# Patient Record
Sex: MALE | Race: WHITE
[De-identification: names, ages, dates, MRNs, and addresses within clinical notes are randomized per-mention and may not be internally consistent; named-entity substitution may affect disease eponyms.]

---

## 2018-07-04 ENCOUNTER — HOSPITAL ENCOUNTER (EMERGENCY)
Dept: HOSPITAL 63 - ER | Age: 78
Discharge: LEFT BEFORE DISCHARGE | End: 2018-07-04
Payer: COMMERCIAL

## 2018-07-04 VITALS — SYSTOLIC BLOOD PRESSURE: 200 MMHG | DIASTOLIC BLOOD PRESSURE: 91 MMHG

## 2018-07-04 DIAGNOSIS — Z71.6: ICD-10-CM

## 2018-07-04 DIAGNOSIS — J44.9: ICD-10-CM

## 2018-07-04 DIAGNOSIS — Z91.19: ICD-10-CM

## 2018-07-04 DIAGNOSIS — Z72.0: ICD-10-CM

## 2018-07-04 DIAGNOSIS — R06.02: Primary | ICD-10-CM

## 2018-07-04 DIAGNOSIS — Z93.0: ICD-10-CM

## 2018-07-04 DIAGNOSIS — Z85.21: ICD-10-CM

## 2018-07-04 DIAGNOSIS — R47.81: ICD-10-CM

## 2018-07-04 DIAGNOSIS — Z93.1: ICD-10-CM

## 2018-07-04 LAB
% BANDS: 4 % (ref 0–9)
% LYMPHS: 9 % (ref 24–48)
% MONOS: 5 % (ref 0–10)
% SEGS: 76 % (ref 35–66)
ALBUMIN SERPL-MCNC: 3.8 G/DL (ref 3.4–5)
ALBUMIN/GLOB SERPL: 0.9 {RATIO} (ref 1–1.7)
ALP SERPL-CCNC: 103 U/L (ref 46–116)
ALT SERPL-CCNC: 21 U/L (ref 16–63)
ANION GAP SERPL CALC-SCNC: 10 MMOL/L (ref 6–14)
ANISOCYTOSIS BLD QL SMEAR: PRESENT
AST SERPL-CCNC: 25 U/L (ref 15–37)
BASOPHILS # BLD AUTO: 0.1 X10^3/UL (ref 0–0.2)
BASOPHILS NFR BLD AUTO: 0 % (ref 0–3)
BASOPHILS NFR BLD: 1 % (ref 0–3)
BILIRUB SERPL-MCNC: 0.4 MG/DL (ref 0.2–1)
BUN/CREAT SERPL: 18 (ref 6–20)
CA-I SERPL ISE-MCNC: 20 MG/DL (ref 8–26)
CALCIUM SERPL-MCNC: 9.3 MG/DL (ref 8.5–10.1)
CHLORIDE SERPL-SCNC: 98 MMOL/L (ref 98–107)
CO2 SERPL-SCNC: 29 MMOL/L (ref 21–32)
CREAT SERPL-MCNC: 1.1 MG/DL (ref 0.7–1.3)
DACRYOCYTES BLD QL SMEAR: PRESENT
EOSINOPHIL NFR BLD AUTO: 6 % (ref 0–5)
EOSINOPHIL NFR BLD: 0.4 X10^3/UL (ref 0–0.7)
EOSINOPHIL NFR BLD: 5 % (ref 0–3)
ERYTHROCYTE [DISTWIDTH] IN BLOOD BY AUTOMATED COUNT: 16.8 % (ref 11.5–14.5)
GFR SERPLBLD BASED ON 1.73 SQ M-ARVRAT: 64.9 ML/MIN
GLOBULIN SER-MCNC: 4.4 G/DL (ref 2.2–3.8)
GLUCOSE SERPL-MCNC: 141 MG/DL (ref 70–99)
HCT VFR BLD CALC: 36 % (ref 39–53)
HGB BLD-MCNC: 12.3 G/DL (ref 13–17.5)
LYMPHOCYTES # BLD: 0.5 X10^3/UL (ref 1–4.8)
LYMPHOCYTES NFR BLD AUTO: 6 % (ref 24–48)
MACROCYTES BLD QL SMEAR: SLIGHT
MCH RBC QN AUTO: 36 PG (ref 25–35)
MCHC RBC AUTO-ENTMCNC: 34 G/DL (ref 31–37)
MCV RBC AUTO: 105 FL (ref 79–100)
MONO #: 0.7 X10^3/UL (ref 0–1.1)
MONOCYTES NFR BLD: 7 % (ref 0–9)
NEUT #: 7.5 X10^3UL (ref 1.8–7.7)
NEUTROPHILS NFR BLD AUTO: 82 % (ref 31–73)
PLATELET # BLD AUTO: 127 X10^3/UL (ref 140–400)
PLATELET # BLD EST: (no result) 10*3/UL
PLATELET CLUMP: PRESENT
POTASSIUM SERPL-SCNC: 3.9 MMOL/L (ref 3.5–5.1)
PROT SERPL-MCNC: 8.2 G/DL (ref 6.4–8.2)
RBC # BLD AUTO: 3.43 X10^6/UL (ref 4.3–5.7)
SODIUM SERPL-SCNC: 137 MMOL/L (ref 136–145)
TOXIC GRANULES BLD QL SMEAR: PRESENT
WBC # BLD AUTO: 9.2 X10^3/UL (ref 4–11)
WBC TOXIC VACUOLES BLD QL SMEAR: PRESENT

## 2018-07-04 PROCEDURE — 84484 ASSAY OF TROPONIN QUANT: CPT

## 2018-07-04 PROCEDURE — 71046 X-RAY EXAM CHEST 2 VIEWS: CPT

## 2018-07-04 PROCEDURE — 85007 BL SMEAR W/DIFF WBC COUNT: CPT

## 2018-07-04 PROCEDURE — 83880 ASSAY OF NATRIURETIC PEPTIDE: CPT

## 2018-07-04 PROCEDURE — 85610 PROTHROMBIN TIME: CPT

## 2018-07-04 PROCEDURE — 93005 ELECTROCARDIOGRAM TRACING: CPT

## 2018-07-04 PROCEDURE — 36415 COLL VENOUS BLD VENIPUNCTURE: CPT

## 2018-07-04 PROCEDURE — 82553 CREATINE MB FRACTION: CPT

## 2018-07-04 PROCEDURE — 85025 COMPLETE CBC W/AUTO DIFF WBC: CPT

## 2018-07-04 PROCEDURE — 80053 COMPREHEN METABOLIC PANEL: CPT

## 2018-07-04 NOTE — RAD
EXAM: Chest, 2 views.

 

HISTORY: Short of breath.

 

COMPARISON: None.

 

FINDINGS: Frontal and lateral views of chest are obtained. There is 

emphysema. There is right middle lobe atelectasis or partial collapse. 

There is no pleural effusion or pneumothorax. There is a port catheter 

with the tip in the superior right atrium. There is a tracheostomy device 

overlying the thoracic inlet. There are healed rib fractures. There is a 

nodular opacities overlying the right lower lobe possibly due to partially

calcified granulomas. 

 

IMPRESSION: 

1. Emphysema with suspected right middle lobe atelectasis or partial 

collapse.

2. Small nodular opacities overlying the right lower lobe, possibly due to

partially calcified granulomas. In the absence of prior studies to assess 

for interval change, short-term radiographic follow-up is recommended.

 

Electronically signed by: Tianna Hollins MD (7/4/2018 12:30 PM) [facility]

## 2018-07-04 NOTE — EKG
[facility] [address], [place] [address]

Test Date:    2018               Test Time:    12:18:24

Pat Name:     JUANPABLO QUIROZ             Department:   

Patient ID:   [id number]           Room:          

Gender:       M                        Technician:   

:          1940               Requested By: NISA GRIFFIN

Order Number: [id number]            Reading MD:   Arnav Veloz MD

                                 Measurements

Intervals                              Axis          

Rate:         72                       P:            90

TN:           166                      QRS:          28

QRSD:         84                       T:            39

QT:           400                                    

QTc:          440                                    

                           Interpretive Statements

SINUS RHYTHM



Electronically Signed On 2018 13:00:29 CDT by Arnav Veloz MD

## 2019-12-07 NOTE — PHYS DOC
Past History


Past Medical History:  Cancer, COPD, Other


Past Surgical History:  Other


Alcohol Use:  None


Drug Use:  None





Adult General


Chief Complaint


Chief Complaint:  SHORTNESS OF BREATH





[facility]


HPI





77-year-old male patient with history of laryngeal cancer and tracheostomy 

placement brought in by EMS for shortness of breath and slurred speech. Patient'

s son and daughter deny state he was inside their car and returning to his 

nursing home that a short time shopping and suddenly had slurred speech and 

decrease of level of consciousness without coughing or shortness of breath and 

had an episodes of TIA. EMS reported that patient had mucous plug with hypoxia 

that improved after suctioning getting oxygen and patient was able to talk. 

Patient states he was hearing all of the conversation  but was not able to talk 

because of mucous plug. Patient denies chest pain, focal neuro deficit, headache

, nausea and vomiting and states his shortness of breath resolved.





Review of Systems


Review of Systems





Constitutional: Denies fever or chills []


Eyes: Denies change in visual acuity, redness, or eye pain []


HENT: Denies nasal congestion or sore throat []


Respiratory: Reports shortness of breath []


Cardiovascular: No additional information not addressed in HPI []


GI: Denies abdominal pain, nausea, vomiting, bloody stools or diarrhea []


: Denies dysuria or hematuria []


Musculoskeletal: Denies back pain or joint pain []


Integument: Denies rash or skin lesions []


Neurologic: Denies headache, focal weakness or sensory changes []


Endocrine: Denies polyuria or polydipsia []





All other systems were reviewed and found to be within normal limits, except as 

documented in this note.





Physical Exam


Physical Exam





Constitutional: Well developed, no acute distress, non-toxic appearance. []


HENT: Normocephalic, atraumatic, oropharynx moist, no oral exudates, nose 

normal. []


Eyes: PERRLA, EOMI, conjunctiva normal, no discharge. [] 


Neck: Tracheostomy tube in place, normal range of motion, no tenderness, supple

, no stridor. [] 


Cardiovascular:Heart rate regular rhythm, no murmur []


Lungs & Thorax:  Bilateral breath sounds clear to auscultation []


Abdomen: Bowel sounds normal, soft, no tenderness, no masses, no pulsatile 

masses, PEG tube in place. [] 


Skin: Warm, dry, no erythema, no rash. [] 


Back: No tenderness, no CVA tenderness. [] 


Extremities: No tenderness, no cyanosis, no clubbing, ROM intact, no edema. [] 


Neurologic: Alert and oriented X 3, normal motor function, normal sensory 

function, no focal deficits noted. []


Psychologic: Affect normal, judgement normal, mood normal. []





Current Patient Data


Vital Signs





 Vital Signs








  Date Time  Temp Pulse Resp B/P (MAP) Pulse Ox O2 Delivery O2 Flow Rate FiO2


 


18 12:20 98.2 70 24  99 Room Air  











EKG


EKG


EKG interpreted by me. EKG at 12:18 showed normal sinus rhythm at rate of 72, T-

wave abnormality in anteroseptal leads, no acute ST and T-wave abnormalities[]





Radiology/Procedures


Radiology/Procedures


[][facility] [address], [place] [address]


 [phone]


 


 IMAGING REPORT





 Signed





PATIENT: JUANPABLO QUIROZ ACCOUNT: [id number] MRN#: [id number]


: 1940 LOCATION: ER AGE: 77


SEX: M EXAM DT: 18 ACCESSION#: [id number]


STATUS: REG ER ORD. PHYSICIAN: NISA GRIFFIN MD 


REASON: shortness of breath


PROCEDURE: CHEST PA & LATERAL





EXAM: Chest, 2 views.


 


HISTORY: Short of breath.


 


COMPARISON: None.


 


FINDINGS: Frontal and lateral views of chest are obtained. There is 


emphysema. There is right middle lobe atelectasis or partial collapse. 


There is no pleural effusion or pneumothorax. There is a port catheter 


with the tip in the superior right atrium. There is a tracheostomy device 


overlying the thoracic inlet. There are healed rib fractures. There is a 


nodular opacities overlying the right lower lobe possibly due to partially


calcified granulomas. 


 


IMPRESSION: 


1. Emphysema with suspected right middle lobe atelectasis or partial 


collapse.


2. Small nodular opacities overlying the right lower lobe, possibly due to


partially calcified granulomas. In the absence of prior studies to assess 


for interval change, short-term radiographic follow-up is recommended.


 


Electronically signed by: Tianna Lopez MD (2018 12:30 PM) [facility]














DICTATED AND SIGNED BY:     TIANNA LOPEZ MD


DATE:     18 4508





CC: GAURANG SINGLETON; NISA GRIFFIN MD ~





Course & Med Decision Making


Course & Med Decision Making


Pertinent Labs and Imaging studies reviewed. (See chart for details)


Evaluation of patient in ER showed 77-year-old male patient with tracheostomy 

and PEG tube placement brought in by EMS because of episodes of slurred speech 

and shortness of breath that resolved after suctioning of his tracheostomy. 

Patient was alert and oriented and denied any problem and didn't want to have 

more evaluation but his daughter in the was concern for TIA. Patient refused to 

have CT of head and signed AGAINST MEDICAL ADVICE and walked out of the ER.


[]





Dragon Disclaimer


Dragon Disclaimer


This electronic medical record was generated, in whole or in part, using a 

voice recognition dictation system.





Departure


Departure:


Impression:  


 Primary Impression:  


 Noncompliance by refusing service


 Additional Impressions:  


 Shortness of breath


 History of laryngeal cancer


 Tracheostomy in place


 Tobacco abuse


 Tobacco abuse counseling


 S/P percutaneous endoscopic gastrostomy (PEG) tube placement


Disposition:  07 AGAINST MEDICAL ADVICE (At 1324)


Condition:  IMPROVED


Referrals:  


GAURANG SINGLETON (PCP)





Problem Qualifiers











NISA GRIFFIN MD 2018 13:24
none